# Patient Record
Sex: MALE | Race: WHITE
[De-identification: names, ages, dates, MRNs, and addresses within clinical notes are randomized per-mention and may not be internally consistent; named-entity substitution may affect disease eponyms.]

---

## 2019-08-19 ENCOUNTER — HOSPITAL ENCOUNTER (INPATIENT)
Dept: HOSPITAL 94 - ER | Age: 59
LOS: 2 days | Discharge: LEFT BEFORE DISCHARGE | DRG: 198 | End: 2019-08-21
Attending: FAMILY MEDICINE | Admitting: SPECIALIST
Payer: MEDICAID

## 2019-08-19 VITALS — WEIGHT: 247.51 LBS | BODY MASS INDEX: 35.43 KG/M2 | HEIGHT: 70 IN

## 2019-08-19 DIAGNOSIS — E11.42: ICD-10-CM

## 2019-08-19 DIAGNOSIS — M54.9: ICD-10-CM

## 2019-08-19 DIAGNOSIS — Z79.82: ICD-10-CM

## 2019-08-19 DIAGNOSIS — I25.110: Primary | ICD-10-CM

## 2019-08-19 DIAGNOSIS — F32.9: ICD-10-CM

## 2019-08-19 DIAGNOSIS — I50.9: ICD-10-CM

## 2019-08-19 DIAGNOSIS — N28.9: ICD-10-CM

## 2019-08-19 DIAGNOSIS — Z82.3: ICD-10-CM

## 2019-08-19 DIAGNOSIS — Z82.49: ICD-10-CM

## 2019-08-19 DIAGNOSIS — F41.9: ICD-10-CM

## 2019-08-19 DIAGNOSIS — B19.20: ICD-10-CM

## 2019-08-19 DIAGNOSIS — Z53.21: ICD-10-CM

## 2019-08-19 DIAGNOSIS — I11.0: ICD-10-CM

## 2019-08-19 DIAGNOSIS — Z79.899: ICD-10-CM

## 2019-08-19 DIAGNOSIS — J43.9: ICD-10-CM

## 2019-08-19 DIAGNOSIS — Z80.9: ICD-10-CM

## 2019-08-19 DIAGNOSIS — E78.00: ICD-10-CM

## 2019-08-19 DIAGNOSIS — I48.91: ICD-10-CM

## 2019-08-19 DIAGNOSIS — E78.5: ICD-10-CM

## 2019-08-19 DIAGNOSIS — Z90.49: ICD-10-CM

## 2019-08-19 DIAGNOSIS — G89.29: ICD-10-CM

## 2019-08-19 DIAGNOSIS — G47.30: ICD-10-CM

## 2019-08-19 LAB
ALBUMIN SERPL BCP-MCNC: 3.7 G/DL (ref 3.4–5)
ALBUMIN/GLOB SERPL: 1.2 {RATIO} (ref 1.1–1.5)
ALP SERPL-CCNC: 71 IU/L (ref 46–116)
ALT SERPL W P-5'-P-CCNC: 21 U/L (ref 12–78)
ANION GAP SERPL CALCULATED.3IONS-SCNC: 8 MMOL/L (ref 8–16)
APTT PPP: 26 SECONDS (ref 22–32)
AST SERPL W P-5'-P-CCNC: 11 U/L (ref 10–37)
BASOPHILS # BLD AUTO: 0.1 X10'3 (ref 0–0.2)
BASOPHILS NFR BLD AUTO: 1 % (ref 0–1)
BILIRUB SERPL-MCNC: 0.5 MG/DL (ref 0.1–1)
BUN SERPL-MCNC: 17 MG/DL (ref 7–18)
BUN/CREAT SERPL: 12.7 (ref 5.4–32)
CALCIUM SERPL-MCNC: 8.6 MG/DL (ref 8.5–10.1)
CHLORIDE SERPL-SCNC: 101 MMOL/L (ref 99–107)
CO2 SERPL-SCNC: 27.7 MMOL/L (ref 24–32)
CREAT SERPL-MCNC: 1.34 MG/DL (ref 0.6–1.1)
EOSINOPHIL # BLD AUTO: 0.4 X10'3 (ref 0–0.9)
EOSINOPHIL NFR BLD AUTO: 4.7 % (ref 0–6)
ERYTHROCYTE [DISTWIDTH] IN BLOOD BY AUTOMATED COUNT: 13.8 % (ref 11.5–14.5)
ETHANOL SERPL-MCNC: < 0.01 GM/DL (ref 0–0.01)
GFR SERPL CREATININE-BSD FRML MDRD: 55 ML/MIN
GLUCOSE SERPL-MCNC: 274 MG/DL (ref 70–104)
HCT VFR BLD AUTO: 41 % (ref 42–52)
HGB BLD-MCNC: 13.6 G/DL (ref 14–17.9)
LYMPHOCYTES # BLD AUTO: 1.9 X10'3 (ref 1.1–4.8)
LYMPHOCYTES NFR BLD AUTO: 22.7 % (ref 21–51)
MAGNESIUM SERPL-MCNC: 1.8 MG/DL (ref 1.5–2.4)
MCH RBC QN AUTO: 29.9 PG (ref 27–31)
MCHC RBC AUTO-ENTMCNC: 33.3 G/DL (ref 33–36.5)
MCV RBC AUTO: 89.7 FL (ref 78–98)
MONOCYTES # BLD AUTO: 0.8 X10'3 (ref 0–0.9)
MONOCYTES NFR BLD AUTO: 9 % (ref 2–12)
NEUTROPHILS # BLD AUTO: 5.2 X10'3 (ref 1.8–7.7)
NEUTROPHILS NFR BLD AUTO: 62.6 % (ref 42–75)
PLATELET # BLD AUTO: 206 X10'3 (ref 140–440)
PMV BLD AUTO: 9.4 FL (ref 7.4–10.4)
POTASSIUM SERPL-SCNC: 3.7 MMOL/L (ref 3.5–5.1)
PROT SERPL-MCNC: 6.8 G/DL (ref 6.4–8.2)
RBC # BLD AUTO: 4.56 X10'6 (ref 4.7–6.1)
SODIUM SERPL-SCNC: 137 MMOL/L (ref 135–145)
WBC # BLD AUTO: 8.4 X10'3 (ref 4.5–11)

## 2019-08-19 PROCEDURE — 80305 DRUG TEST PRSMV DIR OPT OBS: CPT

## 2019-08-19 PROCEDURE — 83880 ASSAY OF NATRIURETIC PEPTIDE: CPT

## 2019-08-19 PROCEDURE — 85610 PROTHROMBIN TIME: CPT

## 2019-08-19 PROCEDURE — 80162 ASSAY OF DIGOXIN TOTAL: CPT

## 2019-08-19 PROCEDURE — 93017 CV STRESS TEST TRACING ONLY: CPT

## 2019-08-19 PROCEDURE — 94640 AIRWAY INHALATION TREATMENT: CPT

## 2019-08-19 PROCEDURE — 36415 COLL VENOUS BLD VENIPUNCTURE: CPT

## 2019-08-19 PROCEDURE — 99285 EMERGENCY DEPT VISIT HI MDM: CPT

## 2019-08-19 PROCEDURE — 78452 HT MUSCLE IMAGE SPECT MULT: CPT

## 2019-08-19 PROCEDURE — 84484 ASSAY OF TROPONIN QUANT: CPT

## 2019-08-19 PROCEDURE — 93005 ELECTROCARDIOGRAM TRACING: CPT

## 2019-08-19 PROCEDURE — 80320 DRUG SCREEN QUANTALCOHOLS: CPT

## 2019-08-19 PROCEDURE — 82948 REAGENT STRIP/BLOOD GLUCOSE: CPT

## 2019-08-19 PROCEDURE — 83735 ASSAY OF MAGNESIUM: CPT

## 2019-08-19 PROCEDURE — 84100 ASSAY OF PHOSPHORUS: CPT

## 2019-08-19 PROCEDURE — 96372 THER/PROPH/DIAG INJ SC/IM: CPT

## 2019-08-19 PROCEDURE — 85025 COMPLETE CBC W/AUTO DIFF WBC: CPT

## 2019-08-19 PROCEDURE — 87081 CULTURE SCREEN ONLY: CPT

## 2019-08-19 PROCEDURE — 83036 HEMOGLOBIN GLYCOSYLATED A1C: CPT

## 2019-08-19 PROCEDURE — 84443 ASSAY THYROID STIM HORMONE: CPT

## 2019-08-19 PROCEDURE — 80061 LIPID PANEL: CPT

## 2019-08-19 PROCEDURE — 94760 N-INVAS EAR/PLS OXIMETRY 1: CPT

## 2019-08-19 PROCEDURE — 80053 COMPREHEN METABOLIC PANEL: CPT

## 2019-08-19 PROCEDURE — 85730 THROMBOPLASTIN TIME PARTIAL: CPT

## 2019-08-19 PROCEDURE — 71045 X-RAY EXAM CHEST 1 VIEW: CPT

## 2019-08-20 VITALS — SYSTOLIC BLOOD PRESSURE: 124 MMHG | DIASTOLIC BLOOD PRESSURE: 70 MMHG

## 2019-08-20 VITALS — SYSTOLIC BLOOD PRESSURE: 125 MMHG | DIASTOLIC BLOOD PRESSURE: 79 MMHG

## 2019-08-20 VITALS — SYSTOLIC BLOOD PRESSURE: 116 MMHG | DIASTOLIC BLOOD PRESSURE: 90 MMHG

## 2019-08-20 VITALS — SYSTOLIC BLOOD PRESSURE: 122 MMHG | DIASTOLIC BLOOD PRESSURE: 69 MMHG

## 2019-08-20 VITALS — DIASTOLIC BLOOD PRESSURE: 73 MMHG | SYSTOLIC BLOOD PRESSURE: 111 MMHG

## 2019-08-20 VITALS — SYSTOLIC BLOOD PRESSURE: 106 MMHG | DIASTOLIC BLOOD PRESSURE: 55 MMHG

## 2019-08-20 VITALS — DIASTOLIC BLOOD PRESSURE: 86 MMHG | SYSTOLIC BLOOD PRESSURE: 136 MMHG

## 2019-08-20 VITALS — SYSTOLIC BLOOD PRESSURE: 107 MMHG | DIASTOLIC BLOOD PRESSURE: 75 MMHG

## 2019-08-20 VITALS — SYSTOLIC BLOOD PRESSURE: 120 MMHG | DIASTOLIC BLOOD PRESSURE: 82 MMHG

## 2019-08-20 VITALS — SYSTOLIC BLOOD PRESSURE: 147 MMHG | DIASTOLIC BLOOD PRESSURE: 97 MMHG

## 2019-08-20 VITALS — DIASTOLIC BLOOD PRESSURE: 60 MMHG | SYSTOLIC BLOOD PRESSURE: 109 MMHG

## 2019-08-20 VITALS — SYSTOLIC BLOOD PRESSURE: 99 MMHG | DIASTOLIC BLOOD PRESSURE: 62 MMHG

## 2019-08-20 VITALS — DIASTOLIC BLOOD PRESSURE: 94 MMHG | SYSTOLIC BLOOD PRESSURE: 141 MMHG

## 2019-08-20 VITALS — DIASTOLIC BLOOD PRESSURE: 68 MMHG | SYSTOLIC BLOOD PRESSURE: 121 MMHG

## 2019-08-20 LAB
AMPHETAMINES UR QL SCN: NEGATIVE
BARBITURATES UR QL SCN: NEGATIVE
BENZODIAZ UR QL SCN: NEGATIVE
BZE UR QL SCN: NEGATIVE
CANNABINOIDS UR QL SCN: NEGATIVE
HBA1C MFR BLD: 10.2 % (ref 4.5–6.2)
METHADONE UR QL SCN: NEGATIVE
OPIATES UR QL SCN: NEGATIVE
PCP UR QL SCN: NEGATIVE

## 2019-08-20 PROCEDURE — 3E033HZ INTRODUCTION OF RADIOACTIVE SUBSTANCE INTO PERIPHERAL VEIN, PERCUTANEOUS APPROACH: ICD-10-PCS

## 2019-08-20 PROCEDURE — 4A02XM4 MEASUREMENT OF CARDIAC TOTAL ACTIVITY, EXTERNAL APPROACH: ICD-10-PCS

## 2019-08-20 RX ADMIN — IPRATROPIUM BROMIDE AND ALBUTEROL SULFATE SCH ML: .5; 3 SOLUTION RESPIRATORY (INHALATION) at 14:45

## 2019-08-20 RX ADMIN — HEPARIN SODIUM SCH UNIT: 5000 INJECTION, SOLUTION INTRAVENOUS; SUBCUTANEOUS at 19:58

## 2019-08-20 RX ADMIN — IPRATROPIUM BROMIDE AND ALBUTEROL SULFATE SCH ML: .5; 3 SOLUTION RESPIRATORY (INHALATION) at 07:24

## 2019-08-20 RX ADMIN — DOCUSATE SODIUM SCH MG: 250 CAPSULE, LIQUID FILLED ORAL at 19:58

## 2019-08-20 RX ADMIN — BUDESONIDE SCH MG: 0.5 INHALANT RESPIRATORY (INHALATION) at 20:18

## 2019-08-20 RX ADMIN — INSULIN LISPRO SCH UNITS: 100 INJECTION, SOLUTION INTRAVENOUS; SUBCUTANEOUS at 20:01

## 2019-08-20 RX ADMIN — IPRATROPIUM BROMIDE AND ALBUTEROL SULFATE SCH ML: .5; 3 SOLUTION RESPIRATORY (INHALATION) at 20:18

## 2019-08-20 RX ADMIN — DOCUSATE SODIUM SCH MG: 250 CAPSULE, LIQUID FILLED ORAL at 10:18

## 2019-08-20 RX ADMIN — INSULIN LISPRO SCH UNITS: 100 INJECTION, SOLUTION INTRAVENOUS; SUBCUTANEOUS at 21:09

## 2019-08-20 RX ADMIN — BUDESONIDE SCH MG: 0.5 INHALANT RESPIRATORY (INHALATION) at 07:24

## 2019-08-20 RX ADMIN — HEPARIN SODIUM SCH UNIT: 5000 INJECTION, SOLUTION INTRAVENOUS; SUBCUTANEOUS at 08:00

## 2019-08-20 RX ADMIN — IPRATROPIUM BROMIDE AND ALBUTEROL SULFATE SCH ML: .5; 3 SOLUTION RESPIRATORY (INHALATION) at 03:44

## 2019-08-20 NOTE — NUR
Problems reprioritized. Patient report given, questions answered & plan of care reviewed 
with LESLIE Friedman.

## 2019-08-20 NOTE — NUR
Patient arrived from ER while I was at lunch, he is laying in bed resting comfortably at 
this time. He is A&O x3, COPELAND and is appropriate, I will continue to monitor.

## 2019-08-20 NOTE — NUR
DM consult: Pt with A1c 10.2 seen at bedside. Pt reports seeing PCP for DM 

management q months, states he takes his meds per rx, and checks BG levels 

two times a day. Pt states BG levels are all over the place and 

acknowledges that BG levels are not well controlled. Pt states he doesn't 

follow a DM diet and very passive about learning ways to better manage DM. 

Pt provided with written DM education with referral to outpatient DM 

class. Per documented hx patient's A1c was 9.3 in October 2017 and prior 

to that was well controlled in the sixes and sevens. Pt does not verbalize 

any change that could've contributed in his A1c increasing. RD encouraged 

pt to attend outpatient DM class and reach out to RD if he has any 

questions. Pt admit with CP. Pt currently NPO and reports being very 

hungry, noted that RN has paged MD for diet order. Pt requests double 

protein TID once diet is advanced, d/w dietary. Pt denies any food 

allergies or difficulty chewing/swallowing. Pt reports diarrhea with 

liquid stools today, discussed BRAT diet and informed pt to let RN know. 

Noted that pt receiving routine Colace. Will continue to follow.

Recommendations:

1) Advance to heart healthy CHO controlled diet as medically indicated

2) Double protein TID once diet advancement

3) Routine bowel care

4) Wt per rx

-------------------------------------------------------------------------------

Addendum: 08/20/19 at 1544 by Tammi Kohli RD

-------------------------------------------------------------------------------

Amended: Links added.

## 2019-08-20 NOTE — NUR
Problems reprioritized. Patient report given, questions answered & plan of care reviewed 
with Shabana NELSON.

## 2019-08-20 NOTE — NUR
Patient's Mag is 1.8, he has orders from ER to give IV mag replacement. Per Dr. Brice, 
discontinue this order.

## 2019-08-20 NOTE — NUR
Patient in room PCU 3013. I have received report from LESLIE Santo in the ER and had the 
opportunity to ask questions and assume patient care.

## 2019-08-20 NOTE — NUR
Paged hospitalist, "Lorin [facility]- room 3013 A Sharla Savage urgent but nonemergent call back 
request for diet order post Lexiscan, needs diet order. Thank you. Waiting on diet orders.

## 2019-08-20 NOTE — NUR
Paged hospitatlist, "[address]dom[facility]- room 3027 A stress test results in- please page whether we 
can order HH diet or not. Thank you"

## 2019-08-21 VITALS — SYSTOLIC BLOOD PRESSURE: 119 MMHG | DIASTOLIC BLOOD PRESSURE: 64 MMHG

## 2019-08-21 VITALS — DIASTOLIC BLOOD PRESSURE: 92 MMHG | SYSTOLIC BLOOD PRESSURE: 156 MMHG

## 2019-08-21 LAB
ALBUMIN SERPL BCP-MCNC: 3.1 G/DL (ref 3.4–5)
ALBUMIN/GLOB SERPL: 1 {RATIO} (ref 1.1–1.5)
ALP SERPL-CCNC: 65 IU/L (ref 46–116)
ALT SERPL W P-5'-P-CCNC: 23 U/L (ref 12–78)
ANION GAP SERPL CALCULATED.3IONS-SCNC: 10 MMOL/L (ref 8–16)
AST SERPL W P-5'-P-CCNC: 8 U/L (ref 10–37)
BASOPHILS # BLD AUTO: 0.1 X10'3 (ref 0–0.2)
BASOPHILS NFR BLD AUTO: 1.4 % (ref 0–1)
BILIRUB SERPL-MCNC: 0.3 MG/DL (ref 0.1–1)
BUN SERPL-MCNC: 22 MG/DL (ref 7–18)
BUN/CREAT SERPL: 16.8 (ref 5.4–32)
CALCIUM SERPL-MCNC: 8.2 MG/DL (ref 8.5–10.1)
CHLORIDE SERPL-SCNC: 107 MMOL/L (ref 99–107)
CHOLEST SERPL-MCNC: 155 MG/DL (ref 0–200)
CHOLEST/HDLC SERPL: 7 {RATIO} (ref 0–4.99)
CO2 SERPL-SCNC: 24.4 MMOL/L (ref 24–32)
CREAT SERPL-MCNC: 1.31 MG/DL (ref 0.6–1.1)
EOSINOPHIL # BLD AUTO: 0.4 X10'3 (ref 0–0.9)
EOSINOPHIL NFR BLD AUTO: 5.5 % (ref 0–6)
ERYTHROCYTE [DISTWIDTH] IN BLOOD BY AUTOMATED COUNT: 13.6 % (ref 11.5–14.5)
GFR SERPL CREATININE-BSD FRML MDRD: 56 ML/MIN
GLUCOSE SERPL-MCNC: 171 MG/DL (ref 70–104)
HCT VFR BLD AUTO: 39.7 % (ref 42–52)
HDLC SERPL-MCNC: 22 MG/DL (ref 35–60)
HGB BLD-MCNC: 13.3 G/DL (ref 14–17.9)
LDLC SERPL DIRECT ASSAY-MCNC: 94 MG/DL (ref 50–100)
LYMPHOCYTES # BLD AUTO: 2.6 X10'3 (ref 1.1–4.8)
LYMPHOCYTES NFR BLD AUTO: 34.8 % (ref 21–51)
MAGNESIUM SERPL-MCNC: 1.9 MG/DL (ref 1.5–2.4)
MCH RBC QN AUTO: 29.6 PG (ref 27–31)
MCHC RBC AUTO-ENTMCNC: 33.6 G/DL (ref 33–36.5)
MCV RBC AUTO: 88.1 FL (ref 78–98)
MONOCYTES # BLD AUTO: 0.8 X10'3 (ref 0–0.9)
MONOCYTES NFR BLD AUTO: 10 % (ref 2–12)
NEUTROPHILS # BLD AUTO: 3.7 X10'3 (ref 1.8–7.7)
NEUTROPHILS NFR BLD AUTO: 48.3 % (ref 42–75)
PHOSPHATE SERPL-MCNC: 3.6 MG/DL (ref 2.3–4.5)
PLATELET # BLD AUTO: 191 X10'3 (ref 140–440)
PMV BLD AUTO: 10.1 FL (ref 7.4–10.4)
POTASSIUM SERPL-SCNC: 3.8 MMOL/L (ref 3.5–5.1)
PROT SERPL-MCNC: 6.1 G/DL (ref 6.4–8.2)
RBC # BLD AUTO: 4.5 X10'6 (ref 4.7–6.1)
SODIUM SERPL-SCNC: 141 MMOL/L (ref 135–145)
TRIGL SERPL-MCNC: 306 MG/DL (ref 20–135)
WBC # BLD AUTO: 7.6 X10'3 (ref 4.5–11)

## 2019-08-21 RX ADMIN — IPRATROPIUM BROMIDE AND ALBUTEROL SULFATE SCH ML: .5; 3 SOLUTION RESPIRATORY (INHALATION) at 08:04

## 2019-08-21 RX ADMIN — IPRATROPIUM BROMIDE AND ALBUTEROL SULFATE SCH ML: .5; 3 SOLUTION RESPIRATORY (INHALATION) at 02:56

## 2019-08-21 RX ADMIN — BUDESONIDE SCH MG: 0.5 INHALANT RESPIRATORY (INHALATION) at 08:00

## 2019-08-21 NOTE — NUR
Patient in room PCU 3013. I have received report from LESLIE Wilder and had the opportunity to 
ask questions and assume patient care.

## 2019-08-21 NOTE — NUR
Informed by CNA that patient is taking out his IV and going home.  Patient is angry, 
belligerant, and using derogatory language toward the staff.  He states that we are 'all 
rude and stupid".  Told patient I would take his IV out or even help him with it and he 
refused, using the sheet to apply pressure to the bleeding.  Asked him if he would sign an 
AMA form and he agreed, however when I went back to the room he was already gone.  I met him 
in the valencia and he refused to sign.  MD notified.

## 2019-08-21 NOTE — NUR
Message to Dr. Bashir - Mr. Magdaleno RM [id number] Left AMA, refused to sign AMA form. Thanks, 
Elyse Cox [place] ext [phone]

## 2019-08-21 NOTE — NUR
Patient is up sitting on side of bed watching TV, he seem anxious and fidgety. I asked if he 
was in pain and he said yes, but doesn't want any pain medication, he just can't sleep here 
and wants to go home. He said he won't wait all day to go home either and will leave. I told 
him do what you have to do.

## 2019-08-21 NOTE — NUR
Problems reprioritized. Patient report given, questions answered & plan of care reviewed 
with LESLIE Pappas.

## 2019-09-19 NOTE — NUR
Patient in room U 3013. I have received report from LESLIE Padgett and had the opportunity to 
ask questions and assume patient care. Patient sitting up in bed eating dinner. He is A&O 
x3, COPELAND and is appropriate. Zackary Suresh

## 2020-02-15 ENCOUNTER — HOSPITAL ENCOUNTER (INPATIENT)
Dept: HOSPITAL 94 - ER | Age: 60
LOS: 2 days | Discharge: HOME | DRG: 139 | End: 2020-02-17
Attending: HOSPITALIST | Admitting: SPECIALIST
Payer: MEDICAID

## 2020-02-15 VITALS — WEIGHT: 258.82 LBS | BODY MASS INDEX: 37.05 KG/M2 | HEIGHT: 70 IN

## 2020-02-15 DIAGNOSIS — M54.9: ICD-10-CM

## 2020-02-15 DIAGNOSIS — F17.200: ICD-10-CM

## 2020-02-15 DIAGNOSIS — E11.42: ICD-10-CM

## 2020-02-15 DIAGNOSIS — I25.10: ICD-10-CM

## 2020-02-15 DIAGNOSIS — I48.91: ICD-10-CM

## 2020-02-15 DIAGNOSIS — R09.02: ICD-10-CM

## 2020-02-15 DIAGNOSIS — J43.9: ICD-10-CM

## 2020-02-15 DIAGNOSIS — R04.2: ICD-10-CM

## 2020-02-15 DIAGNOSIS — G47.30: ICD-10-CM

## 2020-02-15 DIAGNOSIS — R59.0: ICD-10-CM

## 2020-02-15 DIAGNOSIS — G89.29: ICD-10-CM

## 2020-02-15 DIAGNOSIS — N18.3: ICD-10-CM

## 2020-02-15 DIAGNOSIS — E78.00: ICD-10-CM

## 2020-02-15 DIAGNOSIS — E11.22: ICD-10-CM

## 2020-02-15 DIAGNOSIS — J18.9: Primary | ICD-10-CM

## 2020-02-15 DIAGNOSIS — Z90.49: ICD-10-CM

## 2020-02-15 DIAGNOSIS — F41.9: ICD-10-CM

## 2020-02-15 DIAGNOSIS — B19.20: ICD-10-CM

## 2020-02-15 DIAGNOSIS — Z79.01: ICD-10-CM

## 2020-02-15 DIAGNOSIS — I50.32: ICD-10-CM

## 2020-02-15 DIAGNOSIS — I13.0: ICD-10-CM

## 2020-02-15 DIAGNOSIS — F32.9: ICD-10-CM

## 2020-02-15 PROCEDURE — 82948 REAGENT STRIP/BLOOD GLUCOSE: CPT

## 2020-02-15 PROCEDURE — 83036 HEMOGLOBIN GLYCOSYLATED A1C: CPT

## 2020-02-15 PROCEDURE — 84484 ASSAY OF TROPONIN QUANT: CPT

## 2020-02-15 PROCEDURE — 71045 X-RAY EXAM CHEST 1 VIEW: CPT

## 2020-02-15 PROCEDURE — 99285 EMERGENCY DEPT VISIT HI MDM: CPT

## 2020-02-15 PROCEDURE — 87081 CULTURE SCREEN ONLY: CPT

## 2020-02-15 PROCEDURE — 80053 COMPREHEN METABOLIC PANEL: CPT

## 2020-02-15 PROCEDURE — 83605 ASSAY OF LACTIC ACID: CPT

## 2020-02-15 PROCEDURE — 71275 CT ANGIOGRAPHY CHEST: CPT

## 2020-02-15 PROCEDURE — 96375 TX/PRO/DX INJ NEW DRUG ADDON: CPT

## 2020-02-15 PROCEDURE — 93005 ELECTROCARDIOGRAM TRACING: CPT

## 2020-02-15 PROCEDURE — 87040 BLOOD CULTURE FOR BACTERIA: CPT

## 2020-02-15 PROCEDURE — 94640 AIRWAY INHALATION TREATMENT: CPT

## 2020-02-15 PROCEDURE — 85610 PROTHROMBIN TIME: CPT

## 2020-02-15 PROCEDURE — 85025 COMPLETE CBC W/AUTO DIFF WBC: CPT

## 2020-02-15 PROCEDURE — 87503 INFLUENZA DNA AMP PROB ADDL: CPT

## 2020-02-15 PROCEDURE — 84145 PROCALCITONIN (PCT): CPT

## 2020-02-15 PROCEDURE — 94760 N-INVAS EAR/PLS OXIMETRY 1: CPT

## 2020-02-15 PROCEDURE — 83880 ASSAY OF NATRIURETIC PEPTIDE: CPT

## 2020-02-15 PROCEDURE — 96365 THER/PROPH/DIAG IV INF INIT: CPT

## 2020-02-15 PROCEDURE — 80162 ASSAY OF DIGOXIN TOTAL: CPT

## 2020-02-15 PROCEDURE — 85730 THROMBOPLASTIN TIME PARTIAL: CPT

## 2020-02-15 PROCEDURE — 36415 COLL VENOUS BLD VENIPUNCTURE: CPT

## 2020-02-15 PROCEDURE — 87502 INFLUENZA DNA AMP PROBE: CPT

## 2020-02-16 VITALS — SYSTOLIC BLOOD PRESSURE: 163 MMHG | DIASTOLIC BLOOD PRESSURE: 84 MMHG

## 2020-02-16 VITALS — SYSTOLIC BLOOD PRESSURE: 135 MMHG | DIASTOLIC BLOOD PRESSURE: 68 MMHG

## 2020-02-16 VITALS — DIASTOLIC BLOOD PRESSURE: 64 MMHG | SYSTOLIC BLOOD PRESSURE: 137 MMHG

## 2020-02-16 VITALS — DIASTOLIC BLOOD PRESSURE: 87 MMHG | SYSTOLIC BLOOD PRESSURE: 164 MMHG

## 2020-02-16 VITALS — DIASTOLIC BLOOD PRESSURE: 78 MMHG | SYSTOLIC BLOOD PRESSURE: 161 MMHG

## 2020-02-16 VITALS — SYSTOLIC BLOOD PRESSURE: 147 MMHG | DIASTOLIC BLOOD PRESSURE: 73 MMHG

## 2020-02-16 LAB
ALBUMIN SERPL BCP-MCNC: 2.7 G/DL (ref 3.4–5)
ALBUMIN/GLOB SERPL: 0.7 {RATIO} (ref 1.1–1.5)
ALP SERPL-CCNC: 65 IU/L (ref 46–116)
ALT SERPL W P-5'-P-CCNC: 16 U/L (ref 12–78)
ANION GAP SERPL CALCULATED.3IONS-SCNC: 6 MMOL/L (ref 8–16)
APTT PPP: 30 SECONDS (ref 22–32)
AST SERPL W P-5'-P-CCNC: 12 U/L (ref 10–37)
BASOPHILS # BLD AUTO: 0.2 X10'3 (ref 0–0.2)
BASOPHILS NFR BLD AUTO: 1.1 % (ref 0–1)
BILIRUB SERPL-MCNC: 0.6 MG/DL (ref 0.1–1)
BUN SERPL-MCNC: 21 MG/DL (ref 7–18)
BUN/CREAT SERPL: 13.9 (ref 5.4–32)
CALCIUM SERPL-MCNC: 7.9 MG/DL (ref 8.5–10.1)
CHLORIDE SERPL-SCNC: 104 MMOL/L (ref 99–107)
CO2 SERPL-SCNC: 30.5 MMOL/L (ref 24–32)
CREAT SERPL-MCNC: 1.51 MG/DL (ref 0.6–1.1)
EOSINOPHIL # BLD AUTO: 0.6 X10'3 (ref 0–0.9)
EOSINOPHIL NFR BLD AUTO: 4.3 % (ref 0–6)
ERYTHROCYTE [DISTWIDTH] IN BLOOD BY AUTOMATED COUNT: 14.5 % (ref 11.5–14.5)
GFR SERPL CREATININE-BSD FRML MDRD: 48 ML/MIN
GLUCOSE SERPL-MCNC: 213 MG/DL (ref 70–104)
HBA1C MFR BLD: 9.4 % (ref 4.5–6.2)
HCT VFR BLD AUTO: 37.2 % (ref 42–52)
HGB BLD-MCNC: 12.4 G/DL (ref 14–17.9)
LYMPHOCYTES # BLD AUTO: 1.2 X10'3 (ref 1.1–4.8)
LYMPHOCYTES NFR BLD AUTO: 8.7 % (ref 21–51)
MCH RBC QN AUTO: 30.1 PG (ref 27–31)
MCHC RBC AUTO-ENTMCNC: 33.4 G/DL (ref 33–36.5)
MCV RBC AUTO: 90.3 FL (ref 78–98)
MONOCYTES # BLD AUTO: 1 X10'3 (ref 0–0.9)
MONOCYTES NFR BLD AUTO: 7 % (ref 2–12)
NEUTROPHILS # BLD AUTO: 10.9 X10'3 (ref 1.8–7.7)
NEUTROPHILS NFR BLD AUTO: 78.9 % (ref 42–75)
PLATELET # BLD AUTO: 249 X10'3 (ref 140–440)
PMV BLD AUTO: 9.6 FL (ref 7.4–10.4)
POTASSIUM SERPL-SCNC: 4.1 MMOL/L (ref 3.5–5.1)
PROT SERPL-MCNC: 6.4 G/DL (ref 6.4–8.2)
RBC # BLD AUTO: 4.12 X10'6 (ref 4.7–6.1)
SODIUM SERPL-SCNC: 140 MMOL/L (ref 135–145)
WBC # BLD AUTO: 13.8 X10'3 (ref 4.5–11)

## 2020-02-16 PROCEDURE — B3201ZZ COMPUTERIZED TOMOGRAPHY (CT SCAN) OF THORACIC AORTA USING LOW OSMOLAR CONTRAST: ICD-10-PCS

## 2020-02-16 PROCEDURE — B32T1ZZ COMPUTERIZED TOMOGRAPHY (CT SCAN) OF LEFT PULMONARY ARTERY USING LOW OSMOLAR CONTRAST: ICD-10-PCS

## 2020-02-16 PROCEDURE — B32S1ZZ COMPUTERIZED TOMOGRAPHY (CT SCAN) OF RIGHT PULMONARY ARTERY USING LOW OSMOLAR CONTRAST: ICD-10-PCS

## 2020-02-16 RX ADMIN — Medication SCH UNIT: at 12:15

## 2020-02-16 RX ADMIN — DIGOXIN SCH MCG: 0.25 TABLET ORAL at 07:42

## 2020-02-16 RX ADMIN — INSULIN LISPRO SCH UNITS: 100 INJECTION, SOLUTION INTRAVENOUS; SUBCUTANEOUS at 18:53

## 2020-02-16 RX ADMIN — HYDROCODONE BITARTRATE AND ACETAMINOPHEN PRN TAB: 10; 325 TABLET ORAL at 20:12

## 2020-02-16 RX ADMIN — CEFTRIAXONE SCH MLS/HR: 1 INJECTION, SOLUTION INTRAVENOUS at 07:41

## 2020-02-16 RX ADMIN — DILTIAZEM HYDROCHLORIDE SCH MG: 180 CAPSULE, COATED, EXTENDED RELEASE ORAL at 07:44

## 2020-02-16 RX ADMIN — Medication SCH MG: at 07:48

## 2020-02-16 RX ADMIN — IPRATROPIUM BROMIDE AND ALBUTEROL SULFATE SCH ML: .5; 3 SOLUTION RESPIRATORY (INHALATION) at 19:51

## 2020-02-16 RX ADMIN — INSULIN LISPRO SCH UNITS: 100 INJECTION, SOLUTION INTRAVENOUS; SUBCUTANEOUS at 20:23

## 2020-02-16 RX ADMIN — PANTOPRAZOLE SODIUM SCH MG: 40 TABLET, DELAYED RELEASE ORAL at 07:45

## 2020-02-16 RX ADMIN — Medication SCH UNIT: at 07:58

## 2020-02-16 RX ADMIN — INSULIN LISPRO SCH UNITS: 100 INJECTION, SOLUTION INTRAVENOUS; SUBCUTANEOUS at 13:22

## 2020-02-16 RX ADMIN — DOCUSATE SODIUM SCH MG: 250 CAPSULE, LIQUID FILLED ORAL at 19:21

## 2020-02-16 RX ADMIN — IPRATROPIUM BROMIDE SCH MG: 0.5 SOLUTION RESPIRATORY (INHALATION) at 12:16

## 2020-02-16 RX ADMIN — Medication SCH UNIT: at 20:27

## 2020-02-16 RX ADMIN — DOCUSATE SODIUM SCH MG: 250 CAPSULE, LIQUID FILLED ORAL at 07:45

## 2020-02-16 RX ADMIN — INSULIN LISPRO SCH UNITS: 100 INJECTION, SOLUTION INTRAVENOUS; SUBCUTANEOUS at 09:03

## 2020-02-16 RX ADMIN — IPRATROPIUM BROMIDE SCH MG: 0.5 SOLUTION RESPIRATORY (INHALATION) at 08:13

## 2020-02-16 RX ADMIN — HYDROCODONE BITARTRATE AND ACETAMINOPHEN PRN TAB: 10; 325 TABLET ORAL at 04:23

## 2020-02-16 RX ADMIN — HYDROCODONE BITARTRATE AND ACETAMINOPHEN PRN TAB: 10; 325 TABLET ORAL at 15:54

## 2020-02-16 NOTE — NUR
Patient in room PCU 3023. I have received report from  Nusrat NELSON  and had the opportunity to 
ask questions and assume patient care.

## 2020-02-16 NOTE — NUR
RT PAGED FOR PRN TX, PT INCREASING SOB. PT'S 1400 TX GIVEN EARLY BEING THAT PT DOES NOT HAVE 
ANY PRN ORDERS. PT COULD BENEFIT FROM HAVING PRN SVN TX ORDERS.

-------------------------------------------------------------------------------

Addendum: 02/16/20 at 1226 by Nikki Boss RT

-------------------------------------------------------------------------------

Amended: Links added.

## 2020-02-16 NOTE — NUR
Problems reprioritized. Patient report given, questions answered & plan of care reviewed 
with Nusrat NELSON.

## 2020-02-16 NOTE — NUR
Patient in room PCU 3023B. I have received report from Prateek NELSON   and had the opportunity to 
ask questions and assume patient care.

## 2020-02-16 NOTE — NUR
Paged RT



RE MIGUELINA Savage [id number]. Pt requesting breathing treatment, becoming more SOB. thank you!

## 2020-02-16 NOTE — NUR
Problems reprioritized. Patient report given, questions answered & plan of care reviewed 
with Prateek NELSON.

## 2020-02-16 NOTE — NUR
Ambulated with patient: patient walked 300 feet with front wheel walker, with portable 
oxygen. Stopped occasionally to rest and catch breath, no other complaints from pt

## 2020-02-16 NOTE — NUR
DM consult: Pt admitted with A1c of 9.4, down from 10.2 in October. RD intern visited pt at 
bedside to provide written and verbal DM education. Pt reports working on bringing A1c down 
and his PCP in [facility] has recently changed his insulin and medication but unable to recall 
what they are called. Pt reports currently checking BG in the morning and night time. Pt 
reports a good appetite, requesting double protein TIDWM, d/w dietary. Pt denies any food 
allergies, difficulties chewing/swallowing, constipation/diarrhea. LBM 2/15. Will continue 
to monitor. 


-------------------------------------------------------------------------------

Addendum: 02/16/20 at 1422 by Wing Kalin OCONNELL

-------------------------------------------------------------------------------

Amended: Links added.

-------------------------------------------------------------------------------

Addendum: 02/16/20 at 1422 by Gaston Harmon RD

-------------------------------------------------------------------------------

ANISH Approves

## 2020-02-17 VITALS — DIASTOLIC BLOOD PRESSURE: 68 MMHG | SYSTOLIC BLOOD PRESSURE: 116 MMHG

## 2020-02-17 VITALS — SYSTOLIC BLOOD PRESSURE: 129 MMHG | DIASTOLIC BLOOD PRESSURE: 79 MMHG

## 2020-02-17 VITALS — DIASTOLIC BLOOD PRESSURE: 71 MMHG | SYSTOLIC BLOOD PRESSURE: 154 MMHG

## 2020-02-17 VITALS — DIASTOLIC BLOOD PRESSURE: 51 MMHG | SYSTOLIC BLOOD PRESSURE: 156 MMHG

## 2020-02-17 LAB
ALBUMIN SERPL BCP-MCNC: 2.7 G/DL (ref 3.4–5)
ALBUMIN/GLOB SERPL: 0.8 {RATIO} (ref 1.1–1.5)
ALP SERPL-CCNC: 72 IU/L (ref 46–116)
ALT SERPL W P-5'-P-CCNC: 15 U/L (ref 12–78)
ANION GAP SERPL CALCULATED.3IONS-SCNC: 7 MMOL/L (ref 8–16)
AST SERPL W P-5'-P-CCNC: 11 U/L (ref 10–37)
BASOPHILS # BLD AUTO: 0.1 X10'3 (ref 0–0.2)
BASOPHILS NFR BLD AUTO: 0.6 % (ref 0–1)
BILIRUB SERPL-MCNC: 0.4 MG/DL (ref 0.1–1)
BUN SERPL-MCNC: 30 MG/DL (ref 7–18)
BUN/CREAT SERPL: 16.9 (ref 5.4–32)
CALCIUM SERPL-MCNC: 8.4 MG/DL (ref 8.5–10.1)
CHLORIDE SERPL-SCNC: 105 MMOL/L (ref 99–107)
CO2 SERPL-SCNC: 29.3 MMOL/L (ref 24–32)
CREAT SERPL-MCNC: 1.78 MG/DL (ref 0.6–1.1)
EOSINOPHIL # BLD AUTO: 0.3 X10'3 (ref 0–0.9)
EOSINOPHIL NFR BLD AUTO: 2.7 % (ref 0–6)
ERYTHROCYTE [DISTWIDTH] IN BLOOD BY AUTOMATED COUNT: 14.4 % (ref 11.5–14.5)
GFR SERPL CREATININE-BSD FRML MDRD: 39 ML/MIN
GLUCOSE SERPL-MCNC: 204 MG/DL (ref 70–104)
HCT VFR BLD AUTO: 31.4 % (ref 42–52)
HGB BLD-MCNC: 10.8 G/DL (ref 14–17.9)
LYMPHOCYTES # BLD AUTO: 2.1 X10'3 (ref 1.1–4.8)
LYMPHOCYTES NFR BLD AUTO: 19.4 % (ref 21–51)
MCH RBC QN AUTO: 30.8 PG (ref 27–31)
MCHC RBC AUTO-ENTMCNC: 34.3 G/DL (ref 33–36.5)
MCV RBC AUTO: 89.9 FL (ref 78–98)
MONOCYTES # BLD AUTO: 0.9 X10'3 (ref 0–0.9)
MONOCYTES NFR BLD AUTO: 8.3 % (ref 2–12)
NEUTROPHILS # BLD AUTO: 7.5 X10'3 (ref 1.8–7.7)
NEUTROPHILS NFR BLD AUTO: 69 % (ref 42–75)
PLATELET # BLD AUTO: 222 X10'3 (ref 140–440)
PMV BLD AUTO: 9.4 FL (ref 7.4–10.4)
POTASSIUM SERPL-SCNC: 3.7 MMOL/L (ref 3.5–5.1)
PROT SERPL-MCNC: 6.3 G/DL (ref 6.4–8.2)
RBC # BLD AUTO: 3.49 X10'6 (ref 4.7–6.1)
SODIUM SERPL-SCNC: 141 MMOL/L (ref 135–145)
WBC # BLD AUTO: 10.9 X10'3 (ref 4.5–11)

## 2020-02-17 RX ADMIN — IPRATROPIUM BROMIDE AND ALBUTEROL SULFATE SCH ML: .5; 3 SOLUTION RESPIRATORY (INHALATION) at 10:50

## 2020-02-17 RX ADMIN — DOCUSATE SODIUM SCH MG: 250 CAPSULE, LIQUID FILLED ORAL at 07:56

## 2020-02-17 RX ADMIN — IPRATROPIUM BROMIDE AND ALBUTEROL SULFATE SCH ML: .5; 3 SOLUTION RESPIRATORY (INHALATION) at 16:04

## 2020-02-17 RX ADMIN — Medication SCH MG: at 08:02

## 2020-02-17 RX ADMIN — HYDROCODONE BITARTRATE AND ACETAMINOPHEN PRN TAB: 10; 325 TABLET ORAL at 07:54

## 2020-02-17 RX ADMIN — Medication SCH UNIT: at 08:00

## 2020-02-17 RX ADMIN — INSULIN LISPRO SCH UNITS: 100 INJECTION, SOLUTION INTRAVENOUS; SUBCUTANEOUS at 13:54

## 2020-02-17 RX ADMIN — DIGOXIN SCH MCG: 0.25 TABLET ORAL at 07:55

## 2020-02-17 RX ADMIN — DILTIAZEM HYDROCHLORIDE SCH MG: 180 CAPSULE, COATED, EXTENDED RELEASE ORAL at 07:55

## 2020-02-17 RX ADMIN — PANTOPRAZOLE SODIUM SCH MG: 40 TABLET, DELAYED RELEASE ORAL at 07:56

## 2020-02-17 RX ADMIN — INSULIN LISPRO SCH UNITS: 100 INJECTION, SOLUTION INTRAVENOUS; SUBCUTANEOUS at 08:47

## 2020-02-17 RX ADMIN — IPRATROPIUM BROMIDE AND ALBUTEROL SULFATE SCH ML: .5; 3 SOLUTION RESPIRATORY (INHALATION) at 07:28

## 2020-02-17 RX ADMIN — HYDROCODONE BITARTRATE AND ACETAMINOPHEN PRN TAB: 10; 325 TABLET ORAL at 04:21

## 2020-02-17 RX ADMIN — CEFTRIAXONE SCH MLS/HR: 1 INJECTION, SOLUTION INTRAVENOUS at 07:53

## 2020-02-17 RX ADMIN — Medication SCH UNIT: at 12:15

## 2020-02-17 NOTE — NUR
Paged MD



PAGER ID:  [phone] 

MESSAGE:  Nusrat proctor [phone]. MIGUELINA Velazquez [id number]. Pt is requesting to discharge tonight if 
possible, family is at bedside as well.

## 2020-02-17 NOTE — NUR
Per MD, patient stable for discharge home. Discharge packet printed and provided to patient, 
including education on Diabetes Survival Skills, Smoking Cessation, and Pneumonia. Pt 
educated to followup with PCP and get repeat CT lung and provided with hardcopy order for 
that. New prescriptions called into [facility] in [place], per pt request. IV removed with 
catheter intact, and tele monitor removed. All belongings sent with patient. Patient 
escorted from hospital via wheelchair, accompanied by hospital staff and family, and driven 
home via private vehicle.

## 2021-08-30 NOTE — NUR
LMTRC to Alcira Guo re Message Below   Patient in room PCU 3013. I have received report from Shabana NELSON and had the opportunity to 
ask questions and assume patient care.

## 2021-09-17 ENCOUNTER — HOSPITAL ENCOUNTER (OUTPATIENT)
Dept: HOSPITAL 94 - CARD DIAG | Age: 61
Discharge: HOME | End: 2021-09-17
Attending: INTERNAL MEDICINE
Payer: MEDICAID

## 2021-09-17 DIAGNOSIS — I34.0: Primary | ICD-10-CM

## 2021-09-17 DIAGNOSIS — R06.02: ICD-10-CM

## 2021-09-17 PROCEDURE — 93306 TTE W/DOPPLER COMPLETE: CPT
